# Patient Record
Sex: MALE | Race: ASIAN | NOT HISPANIC OR LATINO | URBAN - METROPOLITAN AREA
[De-identification: names, ages, dates, MRNs, and addresses within clinical notes are randomized per-mention and may not be internally consistent; named-entity substitution may affect disease eponyms.]

---

## 2019-08-03 ENCOUNTER — EMERGENCY (EMERGENCY)
Facility: HOSPITAL | Age: 49
LOS: 1 days | Discharge: HOME | End: 2019-08-03
Attending: EMERGENCY MEDICINE
Payer: MEDICAID

## 2019-08-03 VITALS
OXYGEN SATURATION: 96 % | DIASTOLIC BLOOD PRESSURE: 81 MMHG | HEART RATE: 70 BPM | WEIGHT: 175.05 LBS | TEMPERATURE: 98 F | RESPIRATION RATE: 16 BRPM | SYSTOLIC BLOOD PRESSURE: 128 MMHG

## 2019-08-03 PROCEDURE — 70480 CT ORBIT/EAR/FOSSA W/O DYE: CPT | Mod: 26

## 2019-08-03 PROCEDURE — 99284 EMERGENCY DEPT VISIT MOD MDM: CPT

## 2019-08-03 PROCEDURE — 99284 EMERGENCY DEPT VISIT MOD MDM: CPT | Mod: 25

## 2019-08-03 PROCEDURE — 70480 CT ORBIT/EAR/FOSSA W/O DYE: CPT

## 2019-08-03 RX ORDER — IBUPROFEN 200 MG
600 TABLET ORAL ONCE
Refills: 0 | Status: COMPLETED | OUTPATIENT
Start: 2019-08-03 | End: 2019-08-03

## 2019-08-03 RX ADMIN — Medication 600 MILLIGRAM(S): at 23:20

## 2019-08-03 NOTE — ED ADULT TRIAGE NOTE - CHIEF COMPLAINT QUOTE
my son was playing the baseball and the ball hit my left eye at 7 pm , I have blurry vision and pain in my left eye

## 2019-08-03 NOTE — ED ADULT NURSE NOTE - CHPI ED NUR SYMPTOMS NEG
no itching/no purulent drainage/no discharge/no foreign body/no double vision/no drainage/no eye lid swelling/no photophobia

## 2019-08-04 VITALS
DIASTOLIC BLOOD PRESSURE: 70 MMHG | HEART RATE: 68 BPM | RESPIRATION RATE: 16 BRPM | TEMPERATURE: 98 F | OXYGEN SATURATION: 98 % | SYSTOLIC BLOOD PRESSURE: 120 MMHG

## 2019-08-04 RX ADMIN — Medication 600 MILLIGRAM(S): at 00:55

## 2019-08-04 NOTE — ED PROVIDER NOTE - MUSCULOSKELETAL, MLM
Spine appears normal, range of motion is not limited, no muscle or joint tenderness, no C-spine tenderness.

## 2019-08-04 NOTE — ED PROVIDER NOTE - CHPI ED SYMPTOMS NEG
no double vision/no loss of visual area, no headache, no nausea, no vomiting, no syncope, no dizziness

## 2019-08-04 NOTE — ED PROVIDER NOTE - CLINICAL SUMMARY MEDICAL DECISION MAKING FREE TEXT BOX
Patient with trauma left eye. No sign of retinal de-attachment. Will f/o ophthalmologist within 24-48 hrs. Return to the ER if feeling worse, if not feeling better, or new concerning symptoms.

## 2019-08-04 NOTE — ED PROVIDER NOTE - OBJECTIVE STATEMENT
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 Patient reports having immediate pain and blurry vision. Patient states . Patient denies any double vision, loss of visual area, headache, nausea, vomiting, syncope, dizziness, or any other complains. 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 Patient reports having immediate pain, flash of light, and blurry vision. Patient states he could not see the bottom half of his visual field for a few mintues, but that resolved. Patient denies any double vision, floaters, continued loss of visual area, continued photopsia, headache, nausea, vomiting, syncope, dizziness, or any other complains. Allergies: andres

## 2019-08-04 NOTE — ED PROVIDER NOTE - PROGRESS NOTE DETAILS
[facility] center called. Spoke with ophthalmology. Since no visual field deficits and vision 20/20 now, can be seen in clinic tomorrow. Dr. Soler will call him tomorrow to set up appointment for tomorrow.

## 2019-08-04 NOTE — ED PROVIDER NOTE - LEFT EYE
no conjunctiva injection, vision in the left eye is 20/20, tenderness around left orbit with no swelling./pupils equal, round, and reactive to light

## 2020-06-28 NOTE — ED PROVIDER NOTE - CPE EDP PSYCH NORM
6/28-Hgb 12.1, Hct 35.4, .0, Potassium 3.4, BUN 35, Creatine 1.70, Glucose 102, Calcium 12.9
normal...

## 2024-01-04 NOTE — ED PROVIDER NOTE - CONDITION AT DISCHARGE:
[facility] Call Center    Phone Message    May a detailed message be left on voicemail: yes     Reason for Call: Order(s): Home Care Orders: Skilled Nursing: Needs verbal orders for 1 x a week for 9 weeks, O/T for Eval and Treat as well    Action Taken: Message routed to:  [facility] PCC    Travel Screening: Not Applicable                                                                     Improved